# Patient Record
Sex: MALE | Race: WHITE | NOT HISPANIC OR LATINO | URBAN - METROPOLITAN AREA
[De-identification: names, ages, dates, MRNs, and addresses within clinical notes are randomized per-mention and may not be internally consistent; named-entity substitution may affect disease eponyms.]

---

## 2023-08-20 ENCOUNTER — EMERGENCY (EMERGENCY)
Facility: HOSPITAL | Age: 53
LOS: 1 days | Discharge: LEFT BEFORE DISCHARGE | End: 2023-08-20
Attending: EMERGENCY MEDICINE | Admitting: EMERGENCY MEDICINE
Payer: MEDICAID

## 2023-08-20 VITALS
HEART RATE: 107 BPM | WEIGHT: 246.04 LBS | OXYGEN SATURATION: 98 % | TEMPERATURE: 98 F | HEIGHT: 72 IN | DIASTOLIC BLOOD PRESSURE: 80 MMHG | RESPIRATION RATE: 16 BRPM | SYSTOLIC BLOOD PRESSURE: 125 MMHG

## 2023-08-20 PROCEDURE — L9992: CPT

## 2023-08-20 NOTE — ED ADULT NURSE NOTE - NS ED NURSE DISCH DISPOSITION
Coronary artery disease involving native coronary artery of native heart without angina pectoris Coronary artery disease involving native coronary artery of native heart without angina pectoris Coronary artery disease involving native coronary artery of native heart without angina pectoris Left without being seen (saw a nurse but never saw a physician or midlevel provider) SOURAV on CPAP SOURAV on CPAP SOURAV on CPAP

## 2023-08-20 NOTE — ED ADULT NURSE REASSESSMENT NOTE - NS ED NURSE REASSESS COMMENT FT1
Went into pt's room to do blood work as per order. pt nowhere to be found  rechecked room approx. 10 min later, still noted to not be in room. checked bathrooms on floor, pt not on unit   provider and charge RN made aware

## 2023-08-20 NOTE — ED ADULT TRIAGE NOTE - SPO2 (%)
98
PAST SURGICAL HISTORY:  H/O  section     H/O neck surgery partial right mandibulectomy with fibular free flap reconstriction and right neck lymphnode dissection 22    History of hip replacement left hip    History of partial hysterectomy

## 2023-08-20 NOTE — ED ADULT NURSE NOTE - OBJECTIVE STATEMENT
Pt is A&OX4. Pt presented with b/l leg swelling. As per pt. LE increased in size significantly, unsure of the cause. Noticed it 5 days ago and progressively getting worse. R leg is firmer than L; LE is cool to touch, no erythema observed. Pt also has syncopal episode frequently approx 15 episodes per day. Pt was here previously for those symptoms but refused to be admitted. Have not follow up on dizziness and syncopal episode. Pt constantly falls. Currently not taking medication. Denies PMH.

## 2023-08-20 NOTE — ED ADULT NURSE NOTE - NSFALLUNIVINTERV_ED_ALL_ED
Bed/Stretcher in lowest position, wheels locked, appropriate side rails in place/Call bell, personal items and telephone in reach/Instruct patient to call for assistance before getting out of bed/chair/stretcher/Non-slip footwear applied when patient is off stretcher/[place] to call system/Physically safe environment - no spills, clutter or unnecessary equipment/Purposeful proactive rounding/Room/bathroom lighting operational, light cord in reach

## 2023-08-22 DIAGNOSIS — Z53.21 PROCEDURE AND TREATMENT NOT CARRIED OUT DUE TO PATIENT LEAVING PRIOR TO BEING SEEN BY HEALTH CARE PROVIDER: ICD-10-CM

## 2023-08-22 DIAGNOSIS — R22.43 LOCALIZED SWELLING, MASS AND LUMP, LOWER LIMB, BILATERAL: ICD-10-CM
